# Patient Record
Sex: FEMALE | Race: WHITE | Employment: FULL TIME | URBAN - METROPOLITAN AREA
[De-identification: names, ages, dates, MRNs, and addresses within clinical notes are randomized per-mention and may not be internally consistent; named-entity substitution may affect disease eponyms.]

---

## 2017-03-05 ENCOUNTER — APPOINTMENT (OUTPATIENT)
Dept: GENERAL RADIOLOGY | Facility: HOSPITAL | Age: 47
End: 2017-03-05
Attending: EMERGENCY MEDICINE
Payer: MEDICAID

## 2017-03-05 ENCOUNTER — HOSPITAL ENCOUNTER (EMERGENCY)
Facility: HOSPITAL | Age: 47
Discharge: HOME | End: 2017-03-05
Attending: EMERGENCY MEDICINE
Payer: MEDICAID

## 2017-03-05 VITALS
WEIGHT: 210 LBS | DIASTOLIC BLOOD PRESSURE: 82 MMHG | TEMPERATURE: 97 F | HEIGHT: 62 IN | HEART RATE: 86 BPM | BODY MASS INDEX: 38.64 KG/M2 | SYSTOLIC BLOOD PRESSURE: 148 MMHG | RESPIRATION RATE: 18 BRPM | OXYGEN SATURATION: 97 %

## 2017-03-05 DIAGNOSIS — S61.452A DOG BITE OF LEFT HAND, INITIAL ENCOUNTER: Primary | ICD-10-CM
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
## 2017-03-05 PROCEDURE — 99284 EMERGENCY DEPT VISIT MOD MDM: CPT

## 2017-03-05 PROCEDURE — 74000 XR ABDOMEN (KUB) (1 AP VIEW)  (CPT=74000): CPT

## 2017-03-05 PROCEDURE — 73130 X-RAY EXAM OF HAND: CPT

## 2017-03-05 PROCEDURE — 96375 TX/PRO/DX INJ NEW DRUG ADDON: CPT

## 2017-03-05 PROCEDURE — 90471 IMMUNIZATION ADMIN: CPT

## 2017-03-05 PROCEDURE — 96374 THER/PROPH/DIAG INJ IV PUSH: CPT

## 2017-03-05 RX ORDER — IBUPROFEN 600 MG/1
600 TABLET ORAL EVERY 8 HOURS PRN
Qty: 30 TABLET | Refills: 0 | Status: SHIPPED | OUTPATIENT
Start: 2017-03-05 | End: 2017-03-12

## 2017-03-05 RX ORDER — KETOROLAC TROMETHAMINE 30 MG/ML
15 INJECTION, SOLUTION INTRAMUSCULAR; INTRAVENOUS ONCE
Status: COMPLETED | OUTPATIENT
Start: 2017-03-05 | End: 2017-03-05

## 2017-03-05 RX ORDER — MORPHINE SULFATE 4 MG/ML
4 INJECTION, SOLUTION INTRAMUSCULAR; INTRAVENOUS ONCE
Status: COMPLETED | OUTPATIENT
Start: 2017-03-05 | End: 2017-03-05

## 2017-03-05 RX ORDER — AMOXICILLIN AND CLAVULANATE POTASSIUM 875; 125 MG/1; MG/1
1 TABLET, FILM COATED ORAL ONCE
Status: COMPLETED | OUTPATIENT
Start: 2017-03-05 | End: 2017-03-05

## 2017-03-05 RX ORDER — LORAZEPAM 1 MG/1
1 TABLET ORAL ONCE
Status: COMPLETED | OUTPATIENT
Start: 2017-03-05 | End: 2017-03-05

## 2017-03-05 RX ORDER — ONDANSETRON 2 MG/ML
4 INJECTION INTRAMUSCULAR; INTRAVENOUS ONCE
Status: COMPLETED | OUTPATIENT
Start: 2017-03-05 | End: 2017-03-05

## 2017-03-05 RX ORDER — AMOXICILLIN AND CLAVULANATE POTASSIUM 875; 125 MG/1; MG/1
1 TABLET, FILM COATED ORAL 2 TIMES DAILY
Qty: 20 TABLET | Refills: 0 | Status: SHIPPED | OUTPATIENT
Start: 2017-03-05 | End: 2017-03-15

## 2017-03-05 RX ORDER — HYDROCODONE BITARTRATE AND ACETAMINOPHEN 5; 325 MG/1; MG/1
1-2 TABLET ORAL EVERY 4 HOURS PRN
Qty: 20 TABLET | Refills: 0 | Status: SHIPPED | OUTPATIENT
Start: 2017-03-05 | End: 2017-03-12

## 2017-03-06 NOTE — ED NOTES
Pts hand and abd cleansed post steri strip application to prevent infection. Bacitracin applied, sterile gauze applied to hand prior to hand spica application. Pt drowsy to room and states pain less severe 4-5/10 at this time.  Pt tolerated abx without sign

## 2017-03-06 NOTE — ED INITIAL ASSESSMENT (HPI)
Attacked by a pit bull, bit pts L hand and stomach, bleeding controled at this time, pt severely anxious, crying.

## 2017-03-06 NOTE — ED PROVIDER NOTES
Patient Seen in: [facility] Emergency Department    History   Patient presents with:  Bite (integumentary)    Stated Complaint: Bit by dog on hand and stomach    HPI    49-year-old female presents for complaint of dog bite to her left hand and dom ED Triage Vitals   BP --    Pulse 03/05/17 2058 98   Resp 03/05/17 2058 22   Temp 03/05/17 2058 97 °F (36.1 °C)   Temp src 03/05/17 2058 Temporal   SpO2 03/05/17 2058 99 %   O2 Device 03/05/17 2058 None (Room air)       Current:BP   Pulse 98  Temp([facility]) Right knee: Normal.        Left knee: Normal.        Right ankle: Normal.        Left ankle: Normal.        Cervical back: Normal.        Thoracic back: Normal.        Lumbar back: Normal.        Right hand: Normal.        Left hand: She exhibits tend -Cholecystectomy clips    Left hand:  -No evidence of an acute fracture or dislocation  -No significant soft tissue gas or evidence of a radiopaque foreign body      Results faxed on 3/5/2017 at 10:30 PM CT. Can call [phone] if questions.      Antionette Melara
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
## 2017-03-07 ENCOUNTER — HOSPITAL ENCOUNTER (EMERGENCY)
Facility: HOSPITAL | Age: 47
Discharge: HOME | End: 2017-03-07
Attending: EMERGENCY MEDICINE
Payer: MEDICAID

## 2017-03-07 VITALS
WEIGHT: 220 LBS | BODY MASS INDEX: 40 KG/M2 | OXYGEN SATURATION: 96 % | DIASTOLIC BLOOD PRESSURE: 76 MMHG | RESPIRATION RATE: 18 BRPM | TEMPERATURE: 98 F | SYSTOLIC BLOOD PRESSURE: 135 MMHG | HEART RATE: 76 BPM

## 2017-03-07 DIAGNOSIS — L08.9 WOUND INFECTION: ICD-10-CM

## 2017-03-07 DIAGNOSIS — T14.8XXA WOUND INFECTION: ICD-10-CM

## 2017-03-07 DIAGNOSIS — S61.452D DOG BITE OF LEFT HAND, SUBSEQUENT ENCOUNTER: Primary | ICD-10-CM
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
## 2017-03-07 PROCEDURE — 99284 EMERGENCY DEPT VISIT MOD MDM: CPT

## 2017-03-07 PROCEDURE — 96365 THER/PROPH/DIAG IV INF INIT: CPT

## 2017-03-07 NOTE — ED PROVIDER NOTES
Patient Seen in: [facility] Emergency Department    History   Patient presents with:  Cellulitis (integumentary, infectious)    Stated Complaint: Left wound infection    HPI    Patient is a 80-year-old female who was seen here a couple days ago af 99 %   O2 Device 03/07/17 1418 None (Room air)       Current:/81 mmHg  Pulse 84  Temp(Src) 98 °F (36.7 °C) (Oral)  Resp 20  Wt 99.791 kg  SpO2 99%        Physical Exam    Constitutional: Oriented to person, place, and time.  Appears well-developed and Becky Jordan MD  [address]  [phone]    Schedule an appointment as soon as possible for a visit in 1 day        Medications Prescribed:  Current Discharge Medication List

## 2017-03-08 ENCOUNTER — HOSPITAL ENCOUNTER (EMERGENCY)
Facility: HOSPITAL | Age: 47
Discharge: HOME | End: 2017-03-08
Attending: EMERGENCY MEDICINE
Payer: MEDICAID

## 2017-03-08 VITALS
OXYGEN SATURATION: 96 % | DIASTOLIC BLOOD PRESSURE: 100 MMHG | RESPIRATION RATE: 20 BRPM | BODY MASS INDEX: 41.41 KG/M2 | HEART RATE: 80 BPM | TEMPERATURE: 98 F | SYSTOLIC BLOOD PRESSURE: 155 MMHG | HEIGHT: 62 IN | WEIGHT: 225 LBS

## 2017-03-08 DIAGNOSIS — S61.452A DOG BITE OF LEFT HAND, INITIAL ENCOUNTER: Primary | ICD-10-CM

## 2017-03-08 DIAGNOSIS — T14.8XXA WOUND INFECTION: ICD-10-CM
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
## 2017-03-08 DIAGNOSIS — L08.9 WOUND INFECTION: ICD-10-CM

## 2017-03-08 PROCEDURE — 96365 THER/PROPH/DIAG IV INF INIT: CPT

## 2017-03-08 PROCEDURE — 99284 EMERGENCY DEPT VISIT MOD MDM: CPT

## 2017-03-08 NOTE — ED PROVIDER NOTES
Patient Seen in: [facility] AND [facility] Emergency Department    History   Patient presents with:  Bite (integumentary)    Stated Complaint: Dog bite     HPI    Patient is a 51-year-old female who was seen here yesterday for the same issue.   She had a dog bite 20  Ht 157.5 cm (5' 2\")  Wt 102.059 kg  BMI 41.14 kg/m2  SpO2 96%  LMP 09/08/2016        Physical Exam    Patient awake alert her left hand is dressed. Dressing was partially taken down the swelling from yesterday is significantly reduced.   There is no d

## 2018-06-05 ENCOUNTER — HOSPITAL ENCOUNTER (EMERGENCY)
Facility: HOSPITAL | Age: 48
Discharge: HOME | End: 2018-06-05

## 2018-06-05 VITALS
HEART RATE: 82 BPM | SYSTOLIC BLOOD PRESSURE: 169 MMHG | BODY MASS INDEX: 43.24 KG/M2 | OXYGEN SATURATION: 97 % | TEMPERATURE: 98 F | DIASTOLIC BLOOD PRESSURE: 90 MMHG | RESPIRATION RATE: 18 BRPM | WEIGHT: 235 LBS | HEIGHT: 62 IN

## 2018-06-05 DIAGNOSIS — L03.012 PARONYCHIA OF FINGER OF LEFT HAND: Primary | ICD-10-CM

## 2018-06-05 PROCEDURE — 10060 I&D ABSCESS SIMPLE/SINGLE: CPT

## 2018-06-05 PROCEDURE — 99283 EMERGENCY DEPT VISIT LOW MDM: CPT

## 2018-06-05 RX ORDER — DIAPER,BRIEF,INFANT-TODD,DISP
EACH MISCELLANEOUS ONCE
Status: COMPLETED | OUTPATIENT
Start: 2018-06-05 | End: 2018-06-05

## 2018-06-05 RX ORDER — SULFAMETHOXAZOLE AND TRIMETHOPRIM 800; 160 MG/1; MG/1
1 TABLET ORAL 2 TIMES DAILY
Qty: 14 TABLET | Refills: 0 | Status: SHIPPED | OUTPATIENT
Start: 2018-06-05 | End: 2018-06-12

## 2018-06-05 NOTE — ED PROVIDER NOTES
Patient Seen in: [facility] AND [facility] Emergency Department    History   Patient presents with:  Abscess (integumentary)    Stated Complaint: left middle finger infection     HPI    52year old female presents to the emergency room with an infection and pus fluctuant  Abscess/paronychia, yellow pus and small hematoma       Nursing note and vitals reviewed.           ED Course   Labs Reviewed - No data to display    ED Course as of Jun 05 1151  ------------------------------------------------------------      M

## 2018-06-05 NOTE — ED NOTES
Patient has a paronychia to her left third digit for the last 4 days. Patient had a dog bite a year ago and had MRSA, and is concerned it is an infection. States since that time she has had recurring infections.

## 2023-08-22 DIAGNOSIS — Z12.39 ENCOUNTER FOR BREAST CANCER SCREENING OTHER THAN MAMMOGRAM: Primary | ICD-10-CM

## (undated) NOTE — ED AVS SNAPSHOT
[facility] Emergency Department    [facility]. [address] [place] [address]    Phone:  [phone]    Fax:  [phone]           Yesica Cait   MRN: [id number]    Department:  [facility] Emergency Department   Date of Visit:  3 and Class Registration line at [phone] or find a doctor online by visiting [email or web address].    IF THERE IS ANY CHANGE OR WORSENING OF YOUR CONDITION, CALL YOUR PRIMARY CARE PHYSICIAN AT ONCE OR RETURN IMMEDIATELY TO [address] [place] [address].     If

## (undated) NOTE — ED AVS SNAPSHOT
[facility] Emergency Department    [address]     [place] [address]    Phone:  [phone]    Fax:  [phone]           Oliva Aleman   MRN: [id number]    Department:  [facility] Emergency Department   Date of Visit:  3 Amoxicillin-Pot Clavulanate 875-125 MG Tabs   Quantity:  20 tablet   Commonly known as:  AUGMENTIN   Take 1 tablet by mouth 2 (two) times daily.        HYDROcodone-acetaminophen 5-325 MG Tabs   Quantity:  20 tablet   Commonly known as:  NORCO   Take 1-2 ta to a primary care or a specialist physician for a follow-up visit, please tell this physician (or your personal doctor if your instructions are to return to your personal doctor) about any new or lasting problems.  The primary care or specialist physician [place]  [address] [employer]. ([address]) [address] [facility] [employer]. ([address]) [place] [address] ([facility]) [phone]   Luzmaria Gordon [address] [employer].  [facility] & medical emergencies, dial 911.

## (undated) NOTE — ED AVS SNAPSHOT
[facility] Emergency Department    [address]     [place] [address]    Phone:  [phone]    Fax:  [phone]           Alondra Marcos   MRN: [id number]    Department:  [facility] Emergency Department   Date of Visit:  3 indicado, llame al encargado de amie napoles [phone]. It is our goal to assure that you are completely satisfied with every aspect of your visit today.   In an effort to constantly improve our service to you, we would appreciate any positive or negativ Any imaging studies and labs completed today can be reviewed in your MyChart account. You may have had testing done that requires us to contact you. Please make sure we have your correct phone number on file.       I certified that I have received a copy Sign up for [facility], your secure online medical record. [employer] will allow you to access patient instructions from your recent visit,  view other health information, and more. To sign up or find more information, go to [email or web address] and cl

## (undated) NOTE — LETTER
Date & Time: 6/5/2018, 12:01 PM  Patient: Anna Jasso  Encounter Provider(s):    STEVE Corral       To Whom It May Concern:    Anna Jasso was seen and treated in our department on 6/5/2018.  She should not return to work [employer]

## (undated) NOTE — ED AVS SNAPSHOT
[facility] Emergency Department    [address]     [place] [address]    Phone:  [phone]    Fax:  [phone]           Connie Montgomery   MRN: [id number]    Department:  [facility] Emergency Department   Date of Visit:  3 and Class Registration line at [phone] or find a doctor online by visiting [email or web address].    IF THERE IS ANY CHANGE OR WORSENING OF YOUR CONDITION, CALL YOUR PRIMARY CARE PHYSICIAN AT ONCE OR RETURN IMMEDIATELY TO [address] [place] [address].     If

## (undated) NOTE — ED AVS SNAPSHOT
[facility] Emergency Department    [address]     [place] [address]    Phone:  [phone]    Fax:  [phone]           Rosalino Rivas   MRN: [id number]    Department:  [facility] Emergency Department   Date of Visit:  3 It is our goal to assure that you are completely satisfied with every aspect of your visit today.   In an effort to constantly improve our service to you, we would appreciate any positive or negative feedback related to the care you received in our emergenc [employer] account. You may have had testing done that requires us to contact you. Please make sure we have your correct phone number on file.       I certified that I have received a copy of the aftercare instructions; that these instructions have been expl [employer] will allow you to access patient instructions from your recent visit,  view other health information, and more. To sign up or find more information, go to [email or web address] and click on the Sign Up Now link in the [employer] box.      Enter

## (undated) NOTE — ED AVS SNAPSHOT
[facility] Emergency Department    [address]     [place] [address]    Phone:  [phone]    Fax:  [phone]           Perico Wiley   MRN: [id number]    Department:  [facility] Emergency Department   Date of Visit:  3 and Class Registration line at [phone] or find a doctor online by visiting [email or web address].    IF THERE IS ANY CHANGE OR WORSENING OF YOUR CONDITION, CALL YOUR PRIMARY CARE PHYSICIAN AT ONCE OR RETURN IMMEDIATELY TO [address] [place] [address].     If

## (undated) NOTE — ED AVS SNAPSHOT
Benedict Garsia   MRN: [id number]    Department:  [facility] Emergency Department   Date of Visit:  6/5/2018           Disclosure     Insurance plans vary and the physician(s) referred by the ER may not be covered by your plan.  Please contac CARE PHYSICIAN AT ONCE OR RETURN IMMEDIATELY TO THE EMERGENCY DEPARTMENT. If you have been prescribed any medication(s), please fill your prescription right away and begin taking the medication(s) as directed.   If you believe that any of the medications